# Patient Record
Sex: MALE | Race: WHITE
[De-identification: names, ages, dates, MRNs, and addresses within clinical notes are randomized per-mention and may not be internally consistent; named-entity substitution may affect disease eponyms.]

---

## 2017-02-12 ENCOUNTER — HOSPITAL ENCOUNTER (EMERGENCY)
Dept: HOSPITAL 4 - SED | Age: 37
Discharge: HOME | End: 2017-02-12
Payer: COMMERCIAL

## 2017-02-12 VITALS
OXYGEN SATURATION: 99 % | TEMPERATURE: 98.3 F | SYSTOLIC BLOOD PRESSURE: 138 MMHG | DIASTOLIC BLOOD PRESSURE: 72 MMHG | HEART RATE: 89 BPM | RESPIRATION RATE: 18 BRPM

## 2017-02-12 VITALS
RESPIRATION RATE: 18 BRPM | HEART RATE: 112 BPM | OXYGEN SATURATION: 99 % | DIASTOLIC BLOOD PRESSURE: 83 MMHG | SYSTOLIC BLOOD PRESSURE: 150 MMHG | TEMPERATURE: 98.8 F

## 2017-02-12 VITALS — WEIGHT: 190 LBS | BODY MASS INDEX: 30.53 KG/M2 | HEIGHT: 66 IN

## 2017-02-12 DIAGNOSIS — F41.9: Primary | ICD-10-CM

## 2017-02-12 DIAGNOSIS — R11.2: ICD-10-CM

## 2017-02-12 NOTE — NUR
Pt BIB ambulance with c/o feeling "not well", "anxious", comes and goes. Pt 
stated he drank a beer at home, wife called 911. Pt denies emotional upset, 
denies SI. Skin intact, no sign of injury noted. A&Ox4, denies SOB or 
chestpain, denies N/V/D, will continue to monitor

-------------------------------------------------------------------------------

Addendum: 02/12/17 at 2309 by [email or web address]

-------------------------------------------------------------------------------

pt denies ingestion of any subtance, denies smoking

## 2017-02-12 NOTE — NUR
Patient given written and verbal discharge instructions and verbalizes 
understanding.  ER MD Yap discussed with patient the results and treatment 
provided.  Patient in stable condition. ID arm band removed. 

Rx of atarax given. Patient educated on pain management and to follow up with 
PMD. Pain Scale 0/10.

Opportunity for questions provided and answered.

## 2017-02-18 ENCOUNTER — HOSPITAL ENCOUNTER (EMERGENCY)
Dept: HOSPITAL 4 - SED | Age: 37
Discharge: HOME | End: 2017-02-18
Payer: COMMERCIAL

## 2017-02-18 VITALS
SYSTOLIC BLOOD PRESSURE: 133 MMHG | OXYGEN SATURATION: 99 % | BODY MASS INDEX: 28.17 KG/M2 | HEIGHT: 64 IN | HEART RATE: 98 BPM | WEIGHT: 165 LBS | TEMPERATURE: 98.3 F | DIASTOLIC BLOOD PRESSURE: 83 MMHG | RESPIRATION RATE: 20 BRPM

## 2017-02-18 VITALS
TEMPERATURE: 98.1 F | RESPIRATION RATE: 20 BRPM | OXYGEN SATURATION: 99 % | HEART RATE: 82 BPM | SYSTOLIC BLOOD PRESSURE: 121 MMHG | DIASTOLIC BLOOD PRESSURE: 77 MMHG

## 2017-02-18 DIAGNOSIS — F41.9: Primary | ICD-10-CM

## 2017-02-18 LAB
AMPHETAMINES UR QL SCN: NEGATIVE
BARBITURATES UR QL SCN: NEGATIVE
BASOPHILS # BLD AUTO: 0 K/UL (ref 0–0.2)
BASOPHILS NFR BLD AUTO: 0.4 % (ref 0–2)
BENZODIAZ UR QL SCN: NEGATIVE
BZE UR QL SCN: NEGATIVE
CANNABINOIDS UR QL SCN: NEGATIVE
EOSINOPHIL # BLD AUTO: 0.1 K/UL (ref 0–0.4)
EOSINOPHIL NFR BLD AUTO: 0.8 % (ref 0–4)
ERYTHROCYTE [DISTWIDTH] IN BLOOD BY AUTOMATED COUNT: 12.7 % (ref 9–15)
HCT VFR BLD AUTO: 46.3 % (ref 36–54)
HGB BLD-MCNC: 14.9 G/DL (ref 14–18)
LYMPHOCYTES # BLD AUTO: 4.4 K/UL (ref 1–5.5)
LYMPHOCYTES NFR BLD AUTO: 44.6 % (ref 20.5–51.5)
MCH RBC QN AUTO: 30 PG (ref 27–31)
MCHC RBC AUTO-ENTMCNC: 32 % (ref 32–36)
MCV RBC AUTO: 91 FL (ref 79–98)
METHADONE UR-SCNC: NEGATIVE UMOL/L
METHAMPHET UR-SCNC: NEGATIVE UMOL/L
MONOCYTES # BLD MANUAL: 0.8 K/UL (ref 0–1)
MONOCYTES # BLD MANUAL: 8.4 % (ref 1.7–9.3)
NEUTROPHILS # BLD AUTO: 4.5 K/UL (ref 1.8–7.7)
NEUTROPHILS NFR BLD AUTO: 45.8 % (ref 40–70)
OPIATES UR QL SCN: NEGATIVE
OXYCODONE SERPL-MCNC: NEGATIVE NG/ML
PCP UR QL SCN: NEGATIVE
PLATELET # BLD AUTO: 416 K/UL (ref 130–430)
RBC # BLD AUTO: 5.07 MIL/UL (ref 4.2–6.2)
TRICYCLICS UR-MCNC: NEGATIVE NG/ML
URINE PROPOXYPHENE SCREEN: NEGATIVE
WBC # BLD AUTO: 9.8 K/UL (ref 4.8–10.8)

## 2017-02-18 NOTE — NUR
Patient given written and verbal discharge instructions and verbalizes 
understanding. ER MD discussed with patient the results and treatment provided. 
Patient in stable condition. ID arm band removed. IV catheter removed intact 
and dressing applied, no active bleeding.

Rx of xanax given. Patient educated on pain management and to follow up with 
PMD. Pain Scale 0/10.

Opportunity for questions provided and answered.

## 2017-12-07 ENCOUNTER — HOSPITAL ENCOUNTER (EMERGENCY)
Dept: HOSPITAL 4 - SED | Age: 37
LOS: 1 days | Discharge: HOME | End: 2017-12-08
Payer: COMMERCIAL

## 2017-12-07 VITALS — WEIGHT: 185 LBS | HEIGHT: 66 IN | BODY MASS INDEX: 29.73 KG/M2

## 2017-12-07 VITALS — SYSTOLIC BLOOD PRESSURE: 149 MMHG

## 2017-12-07 DIAGNOSIS — F41.9: Primary | ICD-10-CM

## 2017-12-08 VITALS — SYSTOLIC BLOOD PRESSURE: 135 MMHG

## 2020-09-03 NOTE — NUR
PT. PRESENTED TO ER AAOX4 FOR ANXIETY SYMPTOMS, STATES THAT HE DOES NOT FEEL 
NORMAL, UPON QUESTIONING PT. STATED THAT THERE HAS BEEN NO RECENT EVENTS IN HIS 
LIFE THAT COULD LEAD TO A PANIC ATTACK. PULSE 114, /78, TEMP 98.3, DENIES 
SOB, DENIES N/V, EMPLOYED, ON CARDIAC MONITOR None known

## 2021-06-16 ENCOUNTER — HOSPITAL ENCOUNTER (EMERGENCY)
Dept: HOSPITAL 26 - MED | Age: 41
Discharge: LEFT BEFORE DISCHARGE | End: 2021-06-16
Payer: SELF-PAY

## 2021-06-16 DIAGNOSIS — Z53.21: ICD-10-CM

## 2021-06-16 DIAGNOSIS — R51.9: Primary | ICD-10-CM

## 2021-06-16 NOTE — NUR
ATTEMPTED TO TRAIGE PATIENT- PT STATES "I'M FEELING MUCH BETTER I JUST WANT TO 
LEAVE" PT LEFT VIA EMS GURNEY. NO TRIAGE ASSESSMENT PERFORMED.